# Patient Record
Sex: FEMALE | Race: WHITE | NOT HISPANIC OR LATINO | Employment: FULL TIME | URBAN - METROPOLITAN AREA
[De-identification: names, ages, dates, MRNs, and addresses within clinical notes are randomized per-mention and may not be internally consistent; named-entity substitution may affect disease eponyms.]

---

## 2019-12-16 ENCOUNTER — THERAPY VISIT (OUTPATIENT)
Dept: OCCUPATIONAL THERAPY | Facility: CLINIC | Age: 39
End: 2019-12-16
Payer: COMMERCIAL

## 2019-12-16 DIAGNOSIS — M25.521 RIGHT ELBOW PAIN: ICD-10-CM

## 2019-12-16 DIAGNOSIS — M77.01 MEDIAL EPICONDYLITIS OF ELBOW, RIGHT: ICD-10-CM

## 2019-12-16 PROCEDURE — 97035 APP MDLTY 1+ULTRASOUND EA 15: CPT | Mod: GO | Performed by: OCCUPATIONAL THERAPIST

## 2019-12-16 PROCEDURE — 97165 OT EVAL LOW COMPLEX 30 MIN: CPT | Mod: GO | Performed by: OCCUPATIONAL THERAPIST

## 2019-12-16 PROCEDURE — 97110 THERAPEUTIC EXERCISES: CPT | Mod: GO | Performed by: OCCUPATIONAL THERAPIST

## 2019-12-16 PROCEDURE — 97140 MANUAL THERAPY 1/> REGIONS: CPT | Mod: GO | Performed by: OCCUPATIONAL THERAPIST

## 2019-12-16 NOTE — LETTER
[facility]  [address]  [place] [address]  [place] [address]  [phone]    2019    Re: BULMARO Luna   :   1980  MRN:  [id number]   REFERRING PHYSICIAN:   Mike Osman    [facility]  Date of Initial Evaluation:  19  Visits:  Rxs Used: 1  Reason for Referral:     Medial epicondylitis of elbow, right  Right elbow pain    EVALUATION SUMMARY    Hand Therapy Initial Evaluation  Current Date:  2019  Referring Physician: Mike Osman DO  Diagnosis: Right Medial epicondylitis  DOI: 2019    Subjective:  BULMARO Luna is a 39 year old right hand dominant female.    Patient reports symptoms of pain, stiffness/loss of motion and weakness/loss of strength of the right elbow which occurred due to overuse. Since onset symptoms are Gradually getting worse. Special tests: none. Previous treatment: none.        General health as reported by patient is good.  Pertinent medical history includes:Asthma, Depression, Smoking, Changes in Bowel/Bladder  Medical allergies:hydromorphone, fentyhal.  Surgical history: other: sptoplasty.  Medication history: Anti-depressants, please refer to chart.    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  Job Tasks: Computer Work, Prolonged Sitting  Prior functional level:  no limitations  Barriers include:none  Mobility: No difficulty  Transportation: drives  Leisure activities/hobbies: running, watching TV, podcasts, phone    Upper Extremity Functional Index Score:  SCORE:   Column Totals: /80: 63   (A lower score indicates greater disability.)  Objective:  Pain Level (Scale 0-10):   2019   At Rest 0/10   With Use 4/10     Pain Description:  Date 2019   Location (R) medial elbow   Pain Quality Sharp and bruise feeling   Frequency intermittent     Pain is worst  daytime   Exacerbated by  lifting, gripping, opening items,  touch elbow   Relieved by rest   Progression Gradually worsening     Posture  Normal    Sensation  WNL throughout all nerve distributions; per patient report    ROM  Pain Report: - none  + mild    ++ moderate    +++ severe   Elbow 12/16/2019 12/16/2019   AROM (PROM) Left right   Extension +8 +8   Flexion 148 148   Supination 80 85   Pronation 85 80     Wrist 12/16/19 12/16/19   AROM (PROM) Left Right   Extension 75 70   Flexion 85 85     Resisted Testing  Pain Report:  - none    + mild    ++ moderate    +++ severe   Right 12/16/2019   Elbow Extension -   Elbow Flexion -   Supination  -   Pronation +   Wrist Flex with RD, Elbow at side ++   Wrist Flex with UD, Elbow at side +   Wrist Flex with RD, Elbow Ext -   Wrist Flex with UD, Elbow Ext ++   FDS -     Special Tests   - none    + mild    ++ moderate    +++ severe       12/16/2019   Elbow Flexion Test -   Tinels Cubital Tunnel -   Tinels [place] -   ULTT Ulnar Nerve -     Strength   (Measured in pounds)  Pain Report:  - none    + mild    ++ moderate    +++ severe    12/16/2019 12/16/2019   Trials Left  Right   1  2  3 48  45  52 59  50  48   Average 49 52     Lat Pinch 12/16/2019 12/16/2019   Trials Left Right   1  2  3 16 15   Average       3 Pt Pinch 12/16/2019 12/16/2019   Trials Left Right   1  2  3 13 13   Average       Palpation  Pain Report:  - none    + mild    ++ moderate    +++ severe   Date 12/16/2019   Bicep Muscle +   Distal Bicep Tendon +   [place] -   Cubital Tunnel  -   MEP ++   Flexor Origin ++   Flexor Wad ++   Pronator Teres ++   [place] -     Assessment:  Patient presents with symptoms consistent with diagnosis of right Medial Epicondylitis,  with conservative intervention.     Patient's limitations or Problem List includes:  Pain, Weakness and Tightness in musculature of the right elbow which interferes with the patient's ability to perform Self Care Tasks (bathing), Recreational Activities and Household Chores as  compared to previous level of function.  Rehab Potential:  Excellent - Return to full activity, no limitations    Patient will benefit from skilled Occupational Therapy to increase flexibility and overall strength and decrease pain to return to previous activity level and resume normal daily tasks and to reach their rehab potential.    Barriers to Learning:  No barrier    Communication Issues:  Patient appears to be able to clearly communicate and understand verbal and written communication and follow directions correctly.     Chart Review: Brief history including review of medical and/or therapy records relating to the presenting problem and Simple history review with patient    Identified Performance Deficits: bathing/showering, home establishment and management, work and leisure activities    Assessment of Occupational Performance:  3-5 Performance Deficits    Clinical Decision Making (Complexity): Low complexity    Treatment Explanation:  The following has been discussed with the patient:  RX ordered/plan of care  Anticipated outcomes  Possible risks and side effects    Plan:  Frequency:  1 X week, once daily  Duration:  for 8 weeks    Treatment Plan:    Modalities:  US  Therapeutic Exercise:  AROM, PROM, Tendon Gliding, Isotonics and Isometrics  Neuromuscular re-education:  Nerve Gliding and Kinesiotaping  Manual Techniques:  Friction massage and Myofascial release  Orthotic Fabrication:  Forearm based orthosis  Self Care:  Self Care Tasks, Ergonomic Considerations and Diagnostic Education    Discharge Plan:  Achieve all LTG.  Independent in home treatment program.  Reach maximal therapeutic benefit.    Home Exercise Program:  Warmth for stiffness to flexors  Ice after activity for pain to MEP  Flexor and extensor wad stretches  MFR to flexors  TFM to MEP  Recommend purchasing an OTC wrist support for night and PRN during the day    Next Visit:  US??  MFR/TFM  Trial of K-tape  Isometric wrist flexor and  pronator strengthening  Progress to eccentric wrist flexor strengthening as tolerated    Thank you for your referral.    INQUIRIES  Therapist: Pat Benton OTR/L, CHT  [facility]  [address]  [facility] [address]  [facility] [address]  Phone: [phone]  Fax: [phone]

## 2019-12-16 NOTE — PROGRESS NOTES
Hand Therapy Initial Evaluation    Current Date:  12/16/2019  Referring Physician: Mike Osman DO    Diagnosis: Right Medial epicondylitis  DOI: 6/2019    Subjective:  BULMARO Luna is a 39 year old right hand dominant female.    Patient reports symptoms of pain, stiffness/loss of motion and weakness/loss of strength of the right elbow which occurred due to overuse. Since onset symptoms are Gradually getting worse.  Special tests:  none.  Previous treatment: none.    General health as reported by patient is good.  Pertinent medical history includes:Asthma, Depression, Smoking, Changes in Bowel/Bladder  Medical allergies:hydromorphone, fentyhal.  Surgical history: other: sptoplasty.  Medication history: Anti-depressants, please refer to chart.    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  Job Tasks: Computer Work, Prolonged Sitting  Prior functional level:  no limitations  Barriers include:none  Mobility: No difficulty  Transportation: drives  Leisure activities/hobbies: running, watching TV, podcasts, phone    Upper Extremity Functional Index Score:  SCORE:   Column Totals: /80: 63   (A lower score indicates greater disability.)    Objective:  Pain Level (Scale 0-10):   12/16/2019   At Rest 0/10   With Use 4/10     Pain Description:  Date 12/16/2019   Location (R) medial elbow   Pain Quality Sharp and bruise feeling   Frequency intermittent     Pain is worst  daytime   Exacerbated by  lifting, gripping, opening items, touch elbow   Relieved by rest   Progression Gradually worsening     Posture  Normal    Sensation  WNL throughout all nerve distributions; per patient report    ROM  Pain Report: - none  + mild    ++ moderate    +++ severe   Elbow 12/16/2019 12/16/2019   AROM (PROM) Left right   Extension +8 +8   Flexion 148 148   Supination 80 85   Pronation 85 80     Wrist 12/16/19 12/16/19   AROM (PROM) Left Right   Extension  75 70   Flexion 85 85     Resisted Testing  Pain Report:  - none    + mild    ++ moderate    +++ severe   Right 12/16/2019   Elbow Extension -   Elbow Flexion -   Supination  -   Pronation +   Wrist Flex with RD, Elbow at side ++   Wrist Flex with UD, Elbow at side +   Wrist Flex with RD, Elbow Ext -   Wrist Flex with UD, Elbow Ext ++   FDS -     Special Tests   - none    + mild    ++ moderate    +++ severe       12/16/2019   Elbow Flexion Test -   Tinels Cubital Tunnel -   Tinels [place] -   ULTT Ulnar Nerve -     Strength   (Measured in pounds)  Pain Report:  - none    + mild    ++ moderate    +++ severe    12/16/2019 12/16/2019   Trials Left  Right   1  2  3 48  45  52 59  50  48   Average 49 52     Lat Pinch 12/16/2019 12/16/2019   Trials Left Right   1  2  3 16 15   Average       3 Pt Pinch 12/16/2019 12/16/2019   Trials Left Right   1  2  3 13 13   Average       Palpation  Pain Report:  - none    + mild    ++ moderate    +++ severe   Date 12/16/2019   Bicep Muscle +   Distal Bicep Tendon +   [place] -   Cubital Tunnel  -   MEP ++   Flexor Origin ++   Flexor Wad ++   Pronator Teres ++   [place] -     Assessment:  Patient presents with symptoms consistent with diagnosis of right Medial Epicondylitis,  with conservative intervention.     Patient's limitations or Problem List includes:  Pain, Weakness and Tightness in musculature of the right elbow which interferes with the patient's ability to perform Self Care Tasks (bathing), Recreational Activities and Household Chores as compared to previous level of function.    Rehab Potential:  Excellent - Return to full activity, no limitations    Patient will benefit from skilled Occupational Therapy to increase flexibility and overall strength and decrease pain to return to previous activity level and resume normal daily tasks and to reach their rehab potential.    Barriers to Learning:  No barrier    Communication Issues:  Patient appears to  be able to clearly communicate and understand verbal and written communication and follow directions correctly.     Chart Review: Brief history including review of medical and/or therapy records relating to the presenting problem and Simple history review with patient    Identified Performance Deficits: bathing/showering, home establishment and management, work and leisure activities    Assessment of Occupational Performance:  3-5 Performance Deficits    Clinical Decision Making (Complexity): Low complexity    Treatment Explanation:  The following has been discussed with the patient:  RX ordered/plan of care  Anticipated outcomes  Possible risks and side effects    Plan:  Frequency:  1 X week, once daily  Duration:  for 8 weeks  Treatment Plan:    Modalities:  US  Therapeutic Exercise:  AROM, PROM, Tendon Gliding, Isotonics and Isometrics  Neuromuscular re-education:  Nerve Gliding and Kinesiotaping  Manual Techniques:  Friction massage and Myofascial release  Orthotic Fabrication:  Forearm based orthosis  Self Care:  Self Care Tasks, Ergonomic Considerations and Diagnostic Education    Discharge Plan:  Achieve all LTG.  Independent in home treatment program.  Reach maximal therapeutic benefit.    Home Exercise Program:  Warmth for stiffness to flexors  Ice after activity for pain to MEP  Flexor and extensor wad stretches  MFR to flexors  TFM to MEP  Recommend purchasing an OTC wrist support for night and PRN during the day    Next Visit:  US??  MFR/TFM  Trial of K-tape  Isometric wrist flexor and pronator strengthening  Progress to eccentric wrist flexor strengthening as tolerated

## 2019-12-23 ENCOUNTER — THERAPY VISIT (OUTPATIENT)
Dept: OCCUPATIONAL THERAPY | Facility: CLINIC | Age: 39
End: 2019-12-23
Payer: COMMERCIAL

## 2019-12-23 DIAGNOSIS — M77.01 MEDIAL EPICONDYLITIS OF ELBOW, RIGHT: Primary | ICD-10-CM

## 2019-12-23 DIAGNOSIS — M25.521 RIGHT ELBOW PAIN: ICD-10-CM

## 2019-12-23 PROCEDURE — 97035 APP MDLTY 1+ULTRASOUND EA 15: CPT | Mod: GO | Performed by: OCCUPATIONAL THERAPIST

## 2019-12-23 PROCEDURE — 97140 MANUAL THERAPY 1/> REGIONS: CPT | Mod: GO | Performed by: OCCUPATIONAL THERAPIST

## 2019-12-23 PROCEDURE — 97110 THERAPEUTIC EXERCISES: CPT | Mod: GO | Performed by: OCCUPATIONAL THERAPIST

## 2019-12-23 NOTE — PROGRESS NOTES
SOAP note objective information for 12/23/2019.  Please refer to the daily flowsheet for treatment today, total treatment time and time spent performing 1:1 timed codes.     Diagnosis: Right Medial epicondylitis  DOI: 6/2019      Pain Level (Scale 0-10):   12/16/2019 12/23/2019   At Rest 0/10    With Use 4/10 5/10     Pain Description:  Date 12/16/2019   Location (R) medial elbow   Pain Quality Sharp and bruise feeling   Frequency intermittent     Pain is worst  daytime   Exacerbated by  lifting, gripping, opening items, touch elbow   Relieved by rest   Progression Gradually worsening     Posture  Normal    Sensation  WNL throughout all nerve distributions; per patient report    ROM  Pain Report: - none  + mild    ++ moderate    +++ severe   Elbow 12/16/2019 12/16/2019   AROM (PROM) Left right   Extension +8 +8   Flexion 148 148   Supination 80 85   Pronation 85 80     Wrist 12/16/19 12/16/19   AROM (PROM) Left Right   Extension 75 70   Flexion 85 85     Resisted Testing  Pain Report:  - none    + mild    ++ moderate    +++ severe   Right 12/16/2019 12/23/2019     Elbow Extension - -   Elbow Flexion - +   Supination  -    Pronation +    Wrist Flex with RD, Elbow at side ++ ++   Wrist Flex with UD, Elbow at side + ++   Wrist Flex with RD, Elbow Ext -    Wrist Flex with UD, Elbow Ext ++    FDS -      Special Tests   - none    + mild    ++ moderate    +++ severe       12/16/2019   Elbow Flexion Test -   Tinels Cubital Tunnel -   Tinels [place] -   ULTT Ulnar Nerve -     Strength   (Measured in pounds)  Pain Report:  - none    + mild    ++ moderate    +++ severe    12/16/2019 12/16/2019   Trials Left  Right   1  2  3 48  45  52 59  50  48   Average 49 52     Lat Pinch 12/16/2019 12/16/2019   Trials Left Right   1  2  3 16 15   Average       3 Pt Pinch 12/16/2019 12/16/2019   Trials Left Right   1  2  3 13 13   Average       Palpation  Pain Report:  - none    + mild    ++ moderate    +++ severe   Date 12/16/2019    Bicep Muscle +   Distal Bicep Tendon +   [place] -   Cubital Tunnel  -   MEP ++   Flexor Origin ++   Flexor Wad ++   Pronator Teres ++   [place] -     Home Exercise Program:  Warmth for stiffness to flexors  Ice after activity for pain to MEP  Flexor and extensor wad stretches  MFR to flexors  TFM to MEP  Recommend purchasing an OTC wrist support for night and PRN during the day    Next Visit:    MFR/TFM  Trial of K-tape  Isometric wrist flexor and pronator strengthening  Progress to eccentric wrist flexor strengthening as tolerated

## 2019-12-30 ENCOUNTER — THERAPY VISIT (OUTPATIENT)
Dept: OCCUPATIONAL THERAPY | Facility: CLINIC | Age: 39
End: 2019-12-30
Payer: COMMERCIAL

## 2019-12-30 DIAGNOSIS — M77.01 MEDIAL EPICONDYLITIS OF ELBOW, RIGHT: ICD-10-CM

## 2019-12-30 DIAGNOSIS — M25.521 RIGHT ELBOW PAIN: ICD-10-CM

## 2019-12-30 PROCEDURE — 97035 APP MDLTY 1+ULTRASOUND EA 15: CPT | Mod: GO | Performed by: OCCUPATIONAL THERAPIST

## 2019-12-30 PROCEDURE — 97140 MANUAL THERAPY 1/> REGIONS: CPT | Mod: GO | Performed by: OCCUPATIONAL THERAPIST

## 2019-12-30 PROCEDURE — 97110 THERAPEUTIC EXERCISES: CPT | Mod: GO | Performed by: OCCUPATIONAL THERAPIST

## 2020-01-13 ENCOUNTER — THERAPY VISIT (OUTPATIENT)
Dept: OCCUPATIONAL THERAPY | Facility: CLINIC | Age: 40
End: 2020-01-13
Payer: COMMERCIAL

## 2020-01-13 DIAGNOSIS — M25.521 RIGHT ELBOW PAIN: ICD-10-CM

## 2020-01-13 DIAGNOSIS — M77.01 MEDIAL EPICONDYLITIS OF ELBOW, RIGHT: ICD-10-CM

## 2020-01-13 PROCEDURE — 97140 MANUAL THERAPY 1/> REGIONS: CPT | Mod: GO | Performed by: OCCUPATIONAL THERAPIST

## 2020-01-13 PROCEDURE — 97110 THERAPEUTIC EXERCISES: CPT | Mod: GO | Performed by: OCCUPATIONAL THERAPIST

## 2020-01-13 PROCEDURE — 97035 APP MDLTY 1+ULTRASOUND EA 15: CPT | Mod: GO | Performed by: OCCUPATIONAL THERAPIST

## 2020-01-13 NOTE — PROGRESS NOTES
SOAP Note - objective information for 1/13/20      Diagnosis: Right Medial epicondylitis  DOI: 6/2019      Pain Level (Scale 0-10):   12/16/2019 12/23/2019 1/13/20   At Rest 0/10     With Use 4/10 5/10 5/10     Pain Description:  Date 12/16/2019   Location (R) medial elbow   Pain Quality Sharp and bruise feeling   Frequency intermittent     Pain is worst  daytime   Exacerbated by  lifting, gripping, opening items, touch elbow   Relieved by rest   Progression Gradually worsening     ROM  Pain Report: - none  + mild    ++ moderate    +++ severe   Elbow 12/16/2019 12/16/2019   AROM (PROM) Left right   Extension +8 +8   Flexion 148 148   Supination 80 85   Pronation 85 80     Wrist 12/16/19 12/16/19   AROM (PROM) Left Right   Extension 75 70   Flexion 85 85     Resisted Testing  Pain Report:  - none    + mild    ++ moderate    +++ severe   Right 12/16/2019 12/23/2019     Elbow Extension - -   Elbow Flexion - +   Supination  -    Pronation +    Wrist Flex with RD, Elbow at side ++ ++   Wrist Flex with UD, Elbow at side + ++   Wrist Flex with RD, Elbow Ext -    Wrist Flex with UD, Elbow Ext ++    FDS -      Special Tests   - none    + mild    ++ moderate    +++ severe       12/16/2019   Elbow Flexion Test -   Tinels Cubital Tunnel -   Tinels [place] -   ULTT Ulnar Nerve -     Strength   (Measured in pounds)  Pain Report:  - none    + mild    ++ moderate    +++ severe    12/16/2019 12/16/2019   Trials Left  Right   1  2  3 48  45  52 59  50  48   Average 49 52     Lat Pinch 12/16/2019 12/16/2019   Trials Left Right   1  2  3 16 15   Average       3 Pt Pinch 12/16/2019 12/16/2019   Trials Left Right   1  2  3 13 13   Average       Palpation  Pain Report:  - none    + mild    ++ moderate    +++ severe   Date 12/16/2019 1/13/20   Bicep Muscle + -   Distal Bicep Tendon + -   [place] - -   Cubital Tunnel  - -   MEP ++ + to ++   Flexor Origin ++ +   Flexor Wad ++ +   Pronator Teres ++ +   [place] - -      Home Exercise Program:  Warmth for stiffness to flexors  Ice after activity for pain to MEP  Flexor and extensor wad stretches  MFR to flexors  TFM to MEP  Recommend purchasing an OTC wrist support for night and PRN during the day  Isometric wrist flexor and pronator strengthening   K-tape    Next Visit:  US  MFR/TFM  Progress to eccentric wrist flexor strengthening as tolerated

## 2020-01-20 ENCOUNTER — THERAPY VISIT (OUTPATIENT)
Dept: OCCUPATIONAL THERAPY | Facility: CLINIC | Age: 40
End: 2020-01-20
Payer: COMMERCIAL

## 2020-01-20 DIAGNOSIS — M77.01 MEDIAL EPICONDYLITIS OF ELBOW, RIGHT: ICD-10-CM

## 2020-01-20 DIAGNOSIS — M25.521 RIGHT ELBOW PAIN: Primary | ICD-10-CM

## 2020-01-20 PROCEDURE — 97035 APP MDLTY 1+ULTRASOUND EA 15: CPT | Mod: GO | Performed by: OCCUPATIONAL THERAPIST

## 2020-01-20 PROCEDURE — 97110 THERAPEUTIC EXERCISES: CPT | Mod: GO | Performed by: OCCUPATIONAL THERAPIST

## 2020-01-20 PROCEDURE — 97140 MANUAL THERAPY 1/> REGIONS: CPT | Mod: GO | Performed by: OCCUPATIONAL THERAPIST

## 2020-01-27 ENCOUNTER — THERAPY VISIT (OUTPATIENT)
Dept: OCCUPATIONAL THERAPY | Facility: CLINIC | Age: 40
End: 2020-01-27
Payer: COMMERCIAL

## 2020-01-27 DIAGNOSIS — M77.01 MEDIAL EPICONDYLITIS OF ELBOW, RIGHT: ICD-10-CM

## 2020-01-27 DIAGNOSIS — M25.521 RIGHT ELBOW PAIN: ICD-10-CM

## 2020-01-27 PROCEDURE — 97035 APP MDLTY 1+ULTRASOUND EA 15: CPT | Mod: GO | Performed by: OCCUPATIONAL THERAPIST

## 2020-01-27 PROCEDURE — 97110 THERAPEUTIC EXERCISES: CPT | Mod: GO | Performed by: OCCUPATIONAL THERAPIST

## 2020-01-27 PROCEDURE — 97140 MANUAL THERAPY 1/> REGIONS: CPT | Mod: GO | Performed by: OCCUPATIONAL THERAPIST

## 2020-02-03 ENCOUNTER — THERAPY VISIT (OUTPATIENT)
Dept: OCCUPATIONAL THERAPY | Facility: CLINIC | Age: 40
End: 2020-02-03
Payer: COMMERCIAL

## 2020-02-03 DIAGNOSIS — M25.521 RIGHT ELBOW PAIN: ICD-10-CM

## 2020-02-03 DIAGNOSIS — M77.01 MEDIAL EPICONDYLITIS OF ELBOW, RIGHT: ICD-10-CM

## 2020-02-03 PROCEDURE — 97035 APP MDLTY 1+ULTRASOUND EA 15: CPT | Mod: GO | Performed by: OCCUPATIONAL THERAPIST

## 2020-02-03 PROCEDURE — 97110 THERAPEUTIC EXERCISES: CPT | Mod: GO | Performed by: OCCUPATIONAL THERAPIST

## 2020-02-03 PROCEDURE — 97140 MANUAL THERAPY 1/> REGIONS: CPT | Mod: GO | Performed by: OCCUPATIONAL THERAPIST

## 2020-02-03 NOTE — PROGRESS NOTES
SOAP Note - objective information for 2/3/20      Diagnosis: Right Medial epicondylitis  DOI: 6/2019      Pain Level (Scale 0-10):   12/16/2019 12/23/2019 1/13/20 2/3/20   At Rest 0/10      With Use 4/10 5/10 5/10 2/10     Pain Description:  Date 12/16/2019   Location (R) medial elbow   Pain Quality Sharp and bruise feeling   Frequency intermittent     Pain is worst  daytime   Exacerbated by  lifting, gripping, opening items, touch elbow   Relieved by rest   Progression Gradually worsening     ROM  Pain Report: - none  + mild    ++ moderate    +++ severe   Elbow 12/16/2019 12/16/2019   AROM (PROM) Left right   Extension +8 +8   Flexion 148 148   Supination 80 85   Pronation 85 80     Wrist 12/16/19 12/16/19   AROM (PROM) Left Right   Extension 75 70   Flexion 85 85     Resisted Testing  Pain Report:  - none    + mild    ++ moderate    +++ severe   Right 12/16/2019 12/23/2019     Elbow Extension - -   Elbow Flexion - +   Supination  -    Pronation +    Wrist Flex with RD, Elbow at side ++ ++   Wrist Flex with UD, Elbow at side + ++   Wrist Flex with RD, Elbow Ext -    Wrist Flex with UD, Elbow Ext ++    FDS -      Special Tests   - none    + mild    ++ moderate    +++ severe       12/16/2019   Elbow Flexion Test -   Tinels Cubital Tunnel -   Tinels [place] -   ULTT Ulnar Nerve -     Strength   (Measured in pounds)  Pain Report:  - none    + mild    ++ moderate    +++ severe    12/16/2019 12/16/2019 2/3/20   Trials Left  Right Right   1  2  3 48  45  52 59  50  48 50  44  40   Average 49 52 45     Lat Pinch 12/16/2019 12/16/2019   Trials Left Right   1  2  3 16 15   Average       3 Pt Pinch 12/16/2019 12/16/2019   Trials Left Right   1  2  3 13 13   Average       Palpation  Pain Report:  - none    + mild    ++ moderate    +++ severe   Date 12/16/2019 1/13/20 2/3/20   Bicep Muscle + -    Distal Bicep Tendon + -    [place] - -    Cubital Tunnel  - -    MEP ++ + to ++ + to ++   Flexor Origin ++ + +    Flexor Wad ++ + -   Pronator Teres ++ + -   [place] - -      Home Exercise Program:  Warmth for stiffness to flexors  Ice after activity for pain to MEP  Flexor and extensor wad stretches  MFR to flexors  TFM to MEP  Recommend purchasing an OTC wrist support for night and PRN during the day   K-tape  Eccentric wrist flexor strengthening     Next Visit:  MFR/TFM  Strengthening as tolerated

## 2020-02-14 ENCOUNTER — THERAPY VISIT (OUTPATIENT)
Dept: OCCUPATIONAL THERAPY | Facility: CLINIC | Age: 40
End: 2020-02-14
Payer: COMMERCIAL

## 2020-02-14 DIAGNOSIS — M25.521 RIGHT ELBOW PAIN: ICD-10-CM

## 2020-02-14 DIAGNOSIS — M77.01 MEDIAL EPICONDYLITIS OF ELBOW, RIGHT: ICD-10-CM

## 2020-02-14 PROCEDURE — 97140 MANUAL THERAPY 1/> REGIONS: CPT | Mod: GO | Performed by: OCCUPATIONAL THERAPIST

## 2020-02-14 PROCEDURE — 97110 THERAPEUTIC EXERCISES: CPT | Mod: GO | Performed by: OCCUPATIONAL THERAPIST

## 2020-02-14 NOTE — PROGRESS NOTES
Hand Therapy Discharge Note    Current Date:  2/14/2020    Initial Evaluation Date:  12/16/19  Reporting period is from 12/16/19 to 2/14/2020  Number of Visits:  8    Diagnosis: Right Medial epicondylitis  DOI: 6/2019    Subjective:   Subjective changes noted by patient:  The elbow is mostly pain free now.  I continued to do my exercises.  Functional changes noted by patient:  Improvement in Self Care Tasks (dressing, bathing), Recreational Activities and Household Chores  Patient has noted adverse reaction to:  None    Upper Extremity Functional Index Score:  SCORE:   Column Totals: /80: 65   (A lower score indicates greater disability.)      Pain Level (Scale 0-10):   12/16/2019 12/23/2019 1/13/20 2/3/20 2/14/20   At Rest 0/10       With Use 4/10 5/10 5/10 2/10 1/10     Pain Description:  Date 12/16/2019   Location (R) medial elbow   Pain Quality Sharp and bruise feeling   Frequency intermittent     Pain is worst  daytime   Exacerbated by  lifting, gripping, opening items, touch elbow   Relieved by rest   Progression Gradually worsening     ROM  Pain Report: - none  + mild    ++ moderate    +++ severe   Elbow 12/16/2019 12/16/2019   AROM (PROM) Left right   Extension +8 +8   Flexion 148 148   Supination 80 85   Pronation 85 80     Wrist 12/16/19 12/16/19   AROM (PROM) Left Right   Extension 75 70   Flexion 85 85     Resisted Testing  Pain Report:  - none    + mild    ++ moderate    +++ severe   Right 12/16/2019 12/23/2019 2/14/20   Elbow Extension - -    Elbow Flexion - +    Supination  -     Pronation +     Wrist Flex with RD, Elbow at side ++ ++ +   Wrist Flex with UD, Elbow at side + ++ +   Wrist Flex with RD, Elbow Ext -     Wrist Flex with UD, Elbow Ext ++  -   FDS -       Special Tests   - none    + mild    ++ moderate    +++ severe       12/16/2019   Elbow Flexion Test -   Tinels Cubital Tunnel -   Tinels [place] -   ULTT Ulnar Nerve -     Strength   (Measured in pounds)  Pain Report:  - none    +  mild    ++ moderate    +++ severe    12/16/2019 12/16/2019 2/3/20 2/14/20   Trials Left  Right Right Right   1  2  3 48  45  52 59  50  48 50  44  40 57  52  52   Average 49 52 45 54     Lat Pinch 12/16/2019 12/16/2019   Trials Left Right   1  2  3 16 15   Average       3 Pt Pinch 12/16/2019 12/16/2019   Trials Left Right   1  2  3 13 13   Average       Palpation  Pain Report:  - none    + mild    ++ moderate    +++ severe   Date 12/16/2019 1/13/20 2/3/20    Bicep Muscle + -     Distal Bicep Tendon + -     [place] - -     Cubital Tunnel  - -     MEP ++ + to ++ + to ++ +   Flexor Origin ++ + + -   Flexor Wad ++ + -    Pronator Teres ++ + -    [place] - -          Assessment:  Overall Assessment:  Patient's symptoms are resolving.  Patient is progressing well.  Patient is independent in home exercise program.  Patient is ready to be discharged from therapy and continue their home treatment program.  STG/LTG:  See goal sheet for details and updates.    Plan:  Frequency/Duration:  Hold chart open for one month, if no contact is received from patient, discharge will occur at that time with this note serving as the discharge summary.    Recommendations for Home Program   Home Exercise Program:  Warmth for stiffness to flexors  Ice after activity for pain to MEP  Flexor and extensor wad stretches  MFR to flexors  TFM to MEP  OTC wrist support for night and PRN during the day  K-tape  Eccentric wrist flexor strengthening   Pronator strengthening with weight

## 2020-03-23 PROBLEM — M25.521 RIGHT ELBOW PAIN: Status: RESOLVED | Noted: 2019-12-16 | Resolved: 2020-03-23

## 2020-03-23 PROBLEM — M77.01 MEDIAL EPICONDYLITIS OF ELBOW, RIGHT: Status: RESOLVED | Noted: 2019-12-16 | Resolved: 2020-03-23

## 2020-04-09 ENCOUNTER — TELEPHONE (OUTPATIENT)
Dept: PHYSICAL THERAPY | Facility: CLINIC | Age: 40
End: 2020-04-09

## 2020-04-09 NOTE — TELEPHONE ENCOUNTER
Left voicemail for patient regarding potential options for hand therapy during covid-19 pandemic if needed.

## 2024-03-27 ENCOUNTER — TRANSCRIBE ORDERS (OUTPATIENT)
Dept: OTHER | Age: 44
End: 2024-03-27

## 2024-03-27 DIAGNOSIS — G89.29 CHRONIC RIGHT SHOULDER PAIN: Primary | ICD-10-CM

## 2024-03-27 DIAGNOSIS — M25.511 CHRONIC RIGHT SHOULDER PAIN: Primary | ICD-10-CM

## 2024-03-27 ASSESSMENT — ACTIVITIES OF DAILY LIVING (ADL)
WASHING_YOUR_BACK: 10
PUTTING_ON_YOUR_PANTS: 0
WASHING_YOUR_HAIR?: 4
REMOVING_SOMETHING_FROM_YOUR_BACK_POCKET: 0
PUTTING_ON_AN_UNDERSHIRT_OR_A_PULLOVER_SWEATER: 6
WHEN_LYING_ON_THE_INVOLVED_SIDE: 7
AT_ITS_WORST?: 10
PUTTING_ON_A_SHIRT_THAT_BUTTONS_DOWN_THE_FRONT: 0
PLEASE_INDICATE_YOR_PRIMARY_REASON_FOR_REFERRAL_TO_THERAPY:: SHOULDER
PUSHING_WITH_THE_INVOLVED_ARM: 2
PLACING_AN_OBJECT_ON_A_HIGH_SHELF: 8
TOUCHING_THE_BACK_OF_YOUR_NECK: 2
REACHING_FOR_SOMETHING_ON_A_HIGH_SHELF: 10
CARRYING_A_HEAVY_OBJECT_OF_10_POUNDS: 8

## 2024-03-29 ENCOUNTER — THERAPY VISIT (OUTPATIENT)
Dept: PHYSICAL THERAPY | Facility: CLINIC | Age: 44
End: 2024-03-29
Payer: COMMERCIAL

## 2024-03-29 DIAGNOSIS — M25.511 ACUTE PAIN OF RIGHT SHOULDER: Primary | ICD-10-CM

## 2024-03-29 PROCEDURE — 97161 PT EVAL LOW COMPLEX 20 MIN: CPT | Mod: GP | Performed by: PHYSICAL THERAPIST

## 2024-03-29 PROCEDURE — 97140 MANUAL THERAPY 1/> REGIONS: CPT | Mod: GP | Performed by: PHYSICAL THERAPIST

## 2024-03-29 PROCEDURE — 97110 THERAPEUTIC EXERCISES: CPT | Mod: GP | Performed by: PHYSICAL THERAPIST

## 2024-03-29 NOTE — PROGRESS NOTES
PHYSICAL THERAPY EVALUATION  Type of Visit: Evaluation    See electronic medical record for Abuse and Falls Screening details.    Subjective       Presenting condition or subjective complaint: Shoulder pain since October; referred by PCP for likely a rotator cuff issue  Date of onset: 03/27/24 (MD appointment)    Relevant medical history: Asthma; Depression   Dates & types of surgery: Bunionectomy (2024), Septoplasty (2021 and 2008), Ganglian Cyst Removal (1996)    Prior diagnostic imaging/testing results:       Prior therapy history for the same diagnosis, illness or injury: No          Living Environment  Social support: With a significant other or spouse   Type of home: House   Stairs to enter the home: Yes 1 Is there a railing: No   Ramp: No   Stairs inside the home: Yes 3 Is there a railing: No   Help at home: None  Equipment owned:       Employment: Yes Marketing  Hobbies/Interests:      Patient goals for therapy: Reach across my body and back, open cupboards and put things in/pull things out, carry things,  cat, brush and style hair, reach forward, wipe    Pain assessment: Location: right shoulder /Rating: pl 6-7/10 with activity     Objective   SHOULDER EVALUATION  PAIN: Pain Level at Rest: 2/10  Pain Level with Use: 7/10  Pain Location: shoulder and upper arm   Pain Quality: Aching, Dull, Sharp, and Shooting  Pain Frequency: constant  Pain is Exacerbated By: reaching washing, lifting  Pain is Relieved By: rest  POSTURE: Standing Posture: Rounded shoulders, Forward head, Lordosis decreased, internal rotation of bilateral shoulders, abduction of scapulars  ROM:  flexion 155 with painful arc, abduction 145 with pain, opposite shoulders 75% of motion with pain, behind the back and behind the head WFL    STRENGTH:  WFL, except IR 5/5 with pain, abduction 4/5 with limitation due to pain  FLEXIBILITY:  tightness in right UT, levator, pectoralis major, minor,biceps   SPECIAL TESTS:  positive impingement,  bursitis, painful arc  PALPATION:  supraspinatus, biceps, anterior shoulder  JOINT MOBILITY:  right posterior and inferior hypomobility, right anterior normal   CERVICAL SCREEN: WNL    Assessment & Plan   CLINICAL IMPRESSIONS  Medical Diagnosis: right shoulder pain    Treatment Diagnosis: right shoulder pain   Impression/Assessment: Patient is a 43 year old female with right shoulder pain/impingement complaints.  The following significant findings have been identified: Pain, Decreased ROM/flexibility, Decreased joint mobility, Decreased strength, Impaired muscle performance, and Impaired posture. These impairments interfere with their ability to perform self care tasks, recreational activities, and household chores as compared to previous level of function.     Clinical Decision Making (Complexity):  Clinical Presentation: Stable/Uncomplicated  Clinical Presentation Rationale: based on medical and personal factors listed in PT evaluation  Clinical Decision Making (Complexity): Low complexity    PLAN OF CARE  Treatment Interventions:  Interventions: Manual Therapy, Neuromuscular Re-education, Therapeutic Exercise    Long Term Goals     PT Goal 1  Goal Identifier: reaching over head, and to the side over head  Goal Description: able to reach over head and to the side without pain  Rationale: to maximize safety and independence with performance of ADLs and functional tasks;to maximize safety and independence within the home;to maximize safety and independence within the community;to maximize safety and independence with transportation;to maximize safety and independence with self cares  Goal Progress: able to riase arm 145 with pl 7/10  Target Date: 05/31/24      Frequency of Treatment: 1x/ week then every other week  Duration of Treatment: 9 weeks    Recommended Referrals to Other Professionals:  none  Education Assessment:   Learner/Method: Patient;No Barriers to Learning;Demonstration;Pictures/Video    Risks and  benefits of evaluation/treatment have been explained.   Patient/Family/caregiver agrees with Plan of Care.     Evaluation Time:     PT Kelsie, Low Complexity Minutes (14263): 15       Signing Clinician: Kasey Villegas PT

## 2024-03-30 PROBLEM — M25.511 ACUTE PAIN OF RIGHT SHOULDER: Status: ACTIVE | Noted: 2024-03-30

## 2024-04-05 ENCOUNTER — THERAPY VISIT (OUTPATIENT)
Dept: PHYSICAL THERAPY | Facility: CLINIC | Age: 44
End: 2024-04-05
Payer: COMMERCIAL

## 2024-04-05 DIAGNOSIS — M25.511 ACUTE PAIN OF RIGHT SHOULDER: Primary | ICD-10-CM

## 2024-04-05 PROCEDURE — 97112 NEUROMUSCULAR REEDUCATION: CPT | Mod: GP | Performed by: PHYSICAL THERAPIST

## 2024-04-05 PROCEDURE — 97140 MANUAL THERAPY 1/> REGIONS: CPT | Mod: GP | Performed by: PHYSICAL THERAPIST

## 2024-04-05 PROCEDURE — 97110 THERAPEUTIC EXERCISES: CPT | Mod: GP | Performed by: PHYSICAL THERAPIST

## 2024-04-12 ENCOUNTER — THERAPY VISIT (OUTPATIENT)
Dept: PHYSICAL THERAPY | Facility: CLINIC | Age: 44
End: 2024-04-12
Payer: COMMERCIAL

## 2024-04-12 DIAGNOSIS — M25.511 ACUTE PAIN OF RIGHT SHOULDER: Primary | ICD-10-CM

## 2024-04-12 PROCEDURE — 97110 THERAPEUTIC EXERCISES: CPT | Mod: GP | Performed by: PHYSICAL THERAPIST

## 2024-04-12 PROCEDURE — 97140 MANUAL THERAPY 1/> REGIONS: CPT | Mod: GP | Performed by: PHYSICAL THERAPIST

## 2024-04-12 PROCEDURE — 97112 NEUROMUSCULAR REEDUCATION: CPT | Mod: GP | Performed by: PHYSICAL THERAPIST

## 2024-04-26 ENCOUNTER — THERAPY VISIT (OUTPATIENT)
Dept: PHYSICAL THERAPY | Facility: CLINIC | Age: 44
End: 2024-04-26
Payer: COMMERCIAL

## 2024-04-26 DIAGNOSIS — M25.511 ACUTE PAIN OF RIGHT SHOULDER: Primary | ICD-10-CM

## 2024-04-26 PROCEDURE — 97140 MANUAL THERAPY 1/> REGIONS: CPT | Mod: GP | Performed by: PHYSICAL THERAPIST

## 2024-04-26 PROCEDURE — 97110 THERAPEUTIC EXERCISES: CPT | Mod: GP | Performed by: PHYSICAL THERAPIST

## 2024-04-26 PROCEDURE — 97112 NEUROMUSCULAR REEDUCATION: CPT | Mod: GP | Performed by: PHYSICAL THERAPIST
